# Patient Record
Sex: MALE | Race: WHITE | NOT HISPANIC OR LATINO | URBAN - METROPOLITAN AREA
[De-identification: names, ages, dates, MRNs, and addresses within clinical notes are randomized per-mention and may not be internally consistent; named-entity substitution may affect disease eponyms.]

---

## 2018-08-09 ENCOUNTER — OFFICE VISIT (OUTPATIENT)
Dept: URBAN - METROPOLITAN AREA CLINIC 45 | Facility: CLINIC | Age: 83
End: 2018-08-09
Payer: COMMERCIAL

## 2018-08-09 DIAGNOSIS — H35.3232 EXUDATIVE AGE-REL MCLR DEGN, BI, WITH INACT CHRDL NEOVAS: Primary | ICD-10-CM

## 2018-08-09 DIAGNOSIS — H40.1134 PRIMARY OPEN-ANGLE GLAUCOMA, BILATERAL, INDETERMINATE STAGE: ICD-10-CM

## 2018-08-09 PROCEDURE — 99213 OFFICE O/P EST LOW 20 MIN: CPT | Performed by: OPHTHALMOLOGY

## 2018-08-09 ASSESSMENT — INTRAOCULAR PRESSURE
OD: 16
OS: 11

## 2018-08-09 NOTE — IMPRESSION/PLAN
Impression: Primary open-angle glaucoma, bilateral, indeterminate stage: H40.1134. Plan: Discussed diagnosis in detail with patient. Advised patient of condition. Reassured patient of current condition and treatment.

## 2018-08-09 NOTE — IMPRESSION/PLAN
Impression: Exudative age-rel mclr degn, bi, with inact chrdl neovas: H35.3232. OS. OU. Condition: established, stable. Plan: Advised patient that under DMV guidelines it not legal for patient to be driving, Nor safe for patient and others.